# Patient Record
Sex: FEMALE | Race: WHITE | NOT HISPANIC OR LATINO | Employment: FULL TIME | URBAN - METROPOLITAN AREA
[De-identification: names, ages, dates, MRNs, and addresses within clinical notes are randomized per-mention and may not be internally consistent; named-entity substitution may affect disease eponyms.]

---

## 2023-11-21 ENCOUNTER — OFFICE VISIT (OUTPATIENT)
Dept: NEUROLOGY | Facility: CLINIC | Age: 53
End: 2023-11-21
Payer: COMMERCIAL

## 2023-11-21 VITALS
BODY MASS INDEX: 24.15 KG/M2 | DIASTOLIC BLOOD PRESSURE: 61 MMHG | HEIGHT: 60 IN | HEART RATE: 75 BPM | WEIGHT: 123 LBS | SYSTOLIC BLOOD PRESSURE: 90 MMHG

## 2023-11-21 DIAGNOSIS — G44.89 CHRONIC MIXED HEADACHE SYNDROME: Primary | ICD-10-CM

## 2023-11-21 DIAGNOSIS — D32.9 MENINGIOMA (MULTI): ICD-10-CM

## 2023-11-21 PROCEDURE — 99214 OFFICE O/P EST MOD 30 MIN: CPT | Performed by: PSYCHIATRY & NEUROLOGY

## 2023-11-21 NOTE — PATIENT INSTRUCTIONS
Given the interval enlargement of the meningioma reported on your most recent MRI, I agree that it is important to consider surgery or gamma knife.  It makes sense to consult with the oncologist about what to expect with gamma knife versus open surgery.    As we discussed, your headaches may improve when you start on thyroid replacement.  Untreated hypothyroidism can cause headache.    Please see me in the office in 8 months.

## 2024-09-26 ENCOUNTER — APPOINTMENT (OUTPATIENT)
Dept: NEUROLOGY | Facility: CLINIC | Age: 54
End: 2024-09-26
Payer: COMMERCIAL

## 2024-09-26 VITALS
SYSTOLIC BLOOD PRESSURE: 110 MMHG | BODY MASS INDEX: 24.02 KG/M2 | HEIGHT: 60 IN | DIASTOLIC BLOOD PRESSURE: 70 MMHG | HEART RATE: 62 BPM

## 2024-09-26 DIAGNOSIS — D32.9 MENINGIOMA (MULTI): ICD-10-CM

## 2024-09-26 DIAGNOSIS — R42 VERTIGO: ICD-10-CM

## 2024-09-26 DIAGNOSIS — G25.81 RESTLESS LEGS SYNDROME (RLS): ICD-10-CM

## 2024-09-26 DIAGNOSIS — G44.89 CHRONIC MIXED HEADACHE SYNDROME: Primary | ICD-10-CM

## 2024-09-26 PROCEDURE — 99215 OFFICE O/P EST HI 40 MIN: CPT | Performed by: PSYCHIATRY & NEUROLOGY

## 2024-09-26 NOTE — PROGRESS NOTES
Subjective     Jade Yousif is a 54 y.o. year old female seen in follow-up for chronic mixed headache disorder, burning mouth syndrome, left parasagittal meningioma; also endorses vertigo and stabbing pain in toes.    HPI    54-year-old right-handed woman with past medical history significant for irritable bowel syndrome dating to childhood, Hashimoto's thyroiditis, suspected connective tissue disease (Sjogren's?) without confirmation of a specific disorder for which she has seen rheumatology both through the  and through [facility], possible Crohn's disease, lichen sclerosis, remote former cigarette smoking.     I evaluated her initially on 11/27/18 presenting for a number of complaints including headaches, facial sensory disturbance, right lower extremity pain and paresthesias, and a left parasagittal meningioma for which she has seen neurosurgery. I recommended a thoracic spine MRI which showed minor disc bulges and no concerning findings.      I saw her back for an EMG of the right lower extremity which was unremarkable. We discussed lumbar spine MRI but it was denied by insurance and I recommended lumbar spine x-rays.     She returned on 6/11/19. At that visit I reviewed a follow-up brain MRI from 3/4/19 showing stability of the left parasagittal meningioma versus minimal increase in size, but potentially attributed by neuroradiology to differences in slice positioning. She noted ongoing complaints including bilateral facial paresthesias, perimenstrual headaches, right lower extremity dysesthesia. I recommended starting magnesium 400 mg daily for headaches. I sent her for lumbar spine x-rays which were unremarkable.     I evaluated her again on 3/4/2021, by A/V visit. She reported stability from a headache standpoint. She felt that milk of magnesia, taken as a bowel regimen, was helping with headache control. Her brain MRI was repeated in October 2021.     I evaluated her again on 2/28/2023, in the office,  returning after a long interval.  She noted some worsening of headache pattern in conjunction with worsening of burning mouth syndrome and bifacial dysesthesias.  I reviewed headache preventative options including venlafaxine and gabapentin and she was going to discuss these with her PCP at a subsequent visit.  I advised her to continue following with [facility] neurosurgery for meningioma surveillance.    I evaluated her most recently on 11/21/2023.  She continued to endorse an episodic headache pattern.  She was about to start on thyroid replacement and we discussed that this might lead to improvement both in headaches and in burning mouth symptoms.  I advised her to continue following with neurosurgery and to keep her upcoming appointment with oncology regarding her meningioma.    She is evaluated again today in the office.    She has met with neurosurgery and radiation oncology at [facility].  She indicates that open surgery for the meningioma was initially advised and has been recommended to be accomplished within the next year, but after she brought up issues including apparently an allergy to all forms of metal (including potentially surgical staples) and multiple medication sensitivities, it was recommended instead to consider gamma knife radiosurgery.    However, she indicates that concerns have been raised about gamma knife potentially causing cerebral edema in the context of her autoimmune history including Sjogren's and Hashimoto's thyroiditis.  She has not discussed this issue specifically with a rheumatologist.    Accordingly she is not sure whether she should consider proceeding with open surgery for the meningioma or gamma knife radiosurgery.    Her thyroid has been out of range again lately and adjustments have been made to her replacement regimen.    She experiences headaches for the most part only around the time when she used to experience a monthly menstrual cycle.  However, this may amount to 7 consecutive  days of headache.  She takes only acetaminophen 500 mg, reminding me that she has an NSAID allergy and a history of sensitivity to multiple medications.    She continues to note burning mouth symptoms as well as a general burning sensation involving the entire head and upper body on a frequent basis.    Lately she has begun noting intermittent shooting pains in the toes of both feet, for the most part confined to the nighttime hours and relieved by walking, suggestive of RLS.  She does not take anything for it.    She notes intermittent blurry vision which she attributes to blepharitis.    Review of Systems    As per the history of present illness    There is no problem list on file for this patient.    Past Medical History:   Diagnosis Date    Personal history of diseases of the skin and subcutaneous tissue 2018    History of alopecia    Personal history of other benign neoplasm 2022    History of meningioma    Personal history of other benign neoplasm 10/27/2021    History of meningioma    Personal history of other diseases of the circulatory system 2018    History of hypotension    Personal history of other diseases of the digestive system 2018    History of irritable bowel syndrome    Personal history of other specified conditions 2018    History of syncope     Past Surgical History:   Procedure Laterality Date     SECTION, CLASSIC  2018     Section     Social History     Tobacco Use    Smoking status: Former     Types: Cigarettes    Smokeless tobacco: Not on file   Substance Use Topics    Alcohol use: Not on file     family history is not on file.  No current outpatient medications on file.  Not on File    Objective   Neurological Exam  Physical Exam    General: Alert woman who was ambulatory without assistive devices.       Mental Status: Clear sensorium without fluctuation.  Appropriate in conversation.  Good grasp of details of medical history.  Fluent  unremarkable speech without paraphasic errors or hesitancy.     Cranial Nerves:  Funduscopic exam revealed sharp temporal disc margins bilaterally.  Pupils were equal, round and reactive to light with no relative afferent pupillary defect.  Extraocular movements were intact and conjugate without nystagmus.  No ptosis.  Visual fields were full to confrontation tested binocularly.  Facial motor function was symmetrically intact.  Hearing was grossly intact.  No dysarthria.  Shoulder shrug was symmetric.  Tongue protrusion was midline.     Motor: Muscle bulk and tone were normal throughout. There was no pronator drift or asymmetry of finger taps. Confrontation strength was symmetrically 5/5 throughout the upper and lower extremities.      Coordination: There was no postural or rest tremor, myoclonus or dystonic posturing.    Tendon reflexes: Symmetrically 2+ patellar and ankles.     Sensation: Vibration thresholds were normal at the great toes.  Pin was symmetrically sharp over the toes.       Station: Intact and stable.     Gait: Stable and unremarkable.        Assessment/Plan     She appears neurologically stable.    Her headaches are confined to a menstrual timing even though she no longer experiences a cycle, and respond partially to acetaminophen.  She is somewhat reluctant to consider conventional headache medication given her history of multiple medication sensitivities so I suggested a nutraceutical strategy starting with riboflavin 400 mg daily.  I reviewed with her that apart from yellow discoloration of the urine, riboflavin has very few side effects.    She endorses foot symptoms occurring nocturnally that are very suggestive of RLS.  There is no evidence of polyneuropathy on her exam today.  Among natural strategies for RLS I discussed turmeric at a dose of 450 to 500 mg nightly.  I reviewed with her that apart from mild blood thinning properties that might lead to bleeding or bruising, turmeric has fewer  side effects.  She is to stop it if she notes any issues with it.    She pressed me repeatedly today regarding whether the better course for her is to proceed with open meningioma resection or gamma knife radiosurgery.  We discussed these issues in some detail.  I discussed with her that although there are some obvious differences that might weigh in favor of one approach or the other (such as risks inherent in craniotomy compared to a noninvasive approach), things are complicated in her case because of her sensitivities and autoimmune disease.  I advised her that in addition to meeting again with her neurosurgeon and radiation oncologist, ideally in a group conference call format, I would recommend that she confer with a rheumatologist regarding whether her autoimmune history actually puts her at increased risk for complications from gamma knife radiosurgery.  I also discussed with her that in-depth technical questions regarding the pros and cons of open surgery versus gamma knife should be addressed directly to her surgeon and radiation oncologist as they are beyond my expertise and scope of practice.    I advised her to follow-up in 9 months.

## 2024-09-26 NOTE — PATIENT INSTRUCTIONS
As a natural preventative for headaches which may also help to prevent your vertigo episodes, I recommend you try riboflavin (vitamin B2) 400 mg daily.  You should be able to find this over-the-counter at [employer], [employer], or [employer].    As a natural preventative for restless legs syndrome, which I think is the basis for your shooting pain in the toes at night, you can try turmeric 450 to 500 mg at bedtime.  You can obtain this over-the-counter as well in capsule form.  Turmeric is slightly blood thinning so if you note bleeding or bruising, simply stop it.    Please meet again with your neurosurgeon and radiation oncologist to discuss whether over all gamma knife or open surgery is the best approach for your meningioma.  If there are concerns about gamma knife in the context of your history of autoimmune disease, then it may help to get another rheumatology opinion.    Please see me in 9 months.

## 2025-07-27 ENCOUNTER — OFFICE VISIT (OUTPATIENT)
Dept: URGENT CARE | Age: 55
End: 2025-07-27
Payer: COMMERCIAL

## 2025-07-27 VITALS
HEART RATE: 80 BPM | DIASTOLIC BLOOD PRESSURE: 70 MMHG | WEIGHT: 123 LBS | SYSTOLIC BLOOD PRESSURE: 112 MMHG | TEMPERATURE: 98.4 F | HEIGHT: 60 IN | BODY MASS INDEX: 24.15 KG/M2 | OXYGEN SATURATION: 98 % | RESPIRATION RATE: 18 BRPM

## 2025-07-27 DIAGNOSIS — R30.0 BURNING WITH URINATION: ICD-10-CM

## 2025-07-27 DIAGNOSIS — N76.0 ACUTE VAGINITIS: Primary | ICD-10-CM

## 2025-07-27 LAB
POC APPEARANCE, URINE: CLEAR
POC BILIRUBIN, URINE: NEGATIVE
POC BLOOD, URINE: NEGATIVE
POC COLOR, URINE: COLORLESS
POC GLUCOSE, URINE: NEGATIVE MG/DL
POC KETONES, URINE: NEGATIVE MG/DL
POC LEUKOCYTES, URINE: NEGATIVE
POC NITRITE,URINE: NEGATIVE
POC PH, URINE: 6 PH
POC PROTEIN, URINE: NEGATIVE MG/DL
POC SPECIFIC GRAVITY, URINE: <=1.005
POC UROBILINOGEN, URINE: 0.2 EU/DL
PREGNANCY TEST URINE, POC: NEGATIVE

## 2025-07-27 PROCEDURE — 99203 OFFICE O/P NEW LOW 30 MIN: CPT | Performed by: NURSE PRACTITIONER

## 2025-07-27 PROCEDURE — 81003 URINALYSIS AUTO W/O SCOPE: CPT | Performed by: NURSE PRACTITIONER

## 2025-07-27 PROCEDURE — 81025 URINE PREGNANCY TEST: CPT | Performed by: NURSE PRACTITIONER

## 2025-07-27 PROCEDURE — 3008F BODY MASS INDEX DOCD: CPT | Performed by: NURSE PRACTITIONER

## 2025-07-27 RX ORDER — HYOSCYAMINE SULFATE 0.12 MG/1
0.12 TABLET SUBLINGUAL EVERY 4 HOURS PRN
COMMUNITY
Start: 2025-07-19 | End: 2025-08-18

## 2025-07-27 RX ORDER — POLYETHYLENE GLYCOL 3350 17 G/17G
17 POWDER, FOR SOLUTION ORAL DAILY
COMMUNITY

## 2025-07-27 RX ORDER — LANOLIN ALCOHOL/MO/W.PET/CERES
1 CREAM (GRAM) TOPICAL AS NEEDED
COMMUNITY
Start: 2025-07-10 | End: 2025-08-02 | Stop reason: WASHOUT

## 2025-07-27 RX ORDER — WITCH HAZEL 50 %
1 PADS, MEDICATED (EA) TOPICAL
COMMUNITY

## 2025-07-27 RX ORDER — CETIRIZINE HYDROCHLORIDE 10 MG/1
10 TABLET ORAL DAILY
COMMUNITY

## 2025-07-27 RX ORDER — LEVOTHYROXINE SODIUM 75 UG/1
TABLET ORAL
COMMUNITY
Start: 2024-11-01 | End: 2025-08-02 | Stop reason: WASHOUT

## 2025-07-27 RX ORDER — BACLOFEN 10 MG/1
5 TABLET ORAL EVERY 8 HOURS PRN
COMMUNITY
Start: 2025-07-09 | End: 2025-08-08

## 2025-07-27 ASSESSMENT — ENCOUNTER SYMPTOMS
HEMATURIA: 0
DIFFICULTY URINATING: 0
FREQUENCY: 0
COUGH: 0
FATIGUE: 0
CHILLS: 0
ABDOMINAL PAIN: 0
FLANK PAIN: 0
APPETITE CHANGE: 0
VOMITING: 0
CONSTIPATION: 0
ACTIVITY CHANGE: 0
MYALGIAS: 0
DIZZINESS: 0
DYSURIA: 1
DIAPHORESIS: 0
SHORTNESS OF BREATH: 0
NAUSEA: 0

## 2025-07-27 NOTE — PROGRESS NOTES
"Subjective   Patient ID: Jade Yousif is a 54 y.o. female. They present today with a chief complaint of burning with urination (Painful burning with urination x today. ).    History of Present Illness  HPI  Patient reports having a brain tumor removed 2 weeks ago. No urinary cath was used. She states she had an episode of burning with urinating but unsure if it was vaginally related. Denies discharge. Reports her Lichens causing issues with tolerating vaginal testing and the reason she didn't have a urinary catheter. \"I have been hospitalized for yeast before.\"   Denies urinary frequency, fever, chills, back pain.     Past Medical History  Allergies as of 07/27/2025 - Reviewed 07/27/2025   Allergen Reaction Noted    Nsaids (non-steroidal anti-inflammatory drug) Unknown 02/01/2022    Sulfa (sulfonamide antibiotics) Unknown and Other 03/31/2005    Tetracyclines Unknown and Other 03/31/2005    Valacyclovir GI intolerance, GI Upset, and Unknown 11/28/2018       Prescriptions Prior to Admission[1]     Medical History[2]    Surgical History[3]     reports that she has quit smoking. Her smoking use included cigarettes. She does not have any smokeless tobacco history on file.    Review of Systems  Review of Systems   Constitutional:  Negative for activity change, appetite change, chills, diaphoresis and fatigue.   Respiratory:  Negative for cough and shortness of breath.    Cardiovascular:  Negative for chest pain.   Gastrointestinal:  Negative for abdominal pain, constipation, nausea and vomiting.   Genitourinary:  Positive for dysuria. Negative for difficulty urinating, enuresis, flank pain, frequency, hematuria, urgency, vaginal bleeding and vaginal discharge.   Musculoskeletal:  Negative for myalgias.   Neurological:  Negative for dizziness.                                  Objective    Vitals:    07/27/25 1726   BP: 112/70   BP Location: Left arm   Patient Position: Sitting   BP Cuff Size: Small adult   Pulse: 80 "   Resp: 18   Temp: 36.9 °C (98.4 °F)   TempSrc: Oral   SpO2: 98%   Weight: 55.8 kg (123 lb)   Height: (!) 1.524 m (5')     No LMP recorded.    Physical Exam  Vitals reviewed.   Constitutional:       General: She is not in acute distress.    Cardiovascular:      Rate and Rhythm: Normal rate and regular rhythm.   Pulmonary:      Effort: Pulmonary effort is normal.      Breath sounds: Normal breath sounds.     Skin:     General: Skin is warm and dry.     Neurological:      Mental Status: She is alert.     Psychiatric:         Mood and Affect: Mood normal.         Procedures    Point of Care Test & Imaging Results from this visit  Results for orders placed or performed in visit on 07/27/25   POCT UA Automated manually resulted   Result Value Ref Range    POC Color, Urine Colorless (A) Straw, Yellow, Light-Yellow    POC Appearance, Urine Clear Clear    POC Glucose, Urine NEGATIVE NEGATIVE mg/dl    POC Bilirubin, Urine NEGATIVE NEGATIVE    POC Ketones, Urine NEGATIVE NEGATIVE mg/dl    POC Specific Gravity, Urine <=1.005 1.005 - 1.035    POC Blood, Urine NEGATIVE NEGATIVE    POC PH, Urine 6.0 No Reference Range Established PH    POC Protein, Urine NEGATIVE NEGATIVE mg/dl    POC Urobilinogen, Urine 0.2 0.2, 1.0 EU/DL    Poc Nitrite, Urine NEGATIVE NEGATIVE    POC Leukocytes, Urine NEGATIVE NEGATIVE   POCT pregnancy, urine manually resulted   Result Value Ref Range    Preg Test, Ur Negative Negative      Imaging  No results found.    Cardiology, Vascular, and Other Imaging  No other imaging results found for the past 2 days      Diagnostic study results (if any) were reviewed by MARJAN Felix.    Assessment/Plan   Allergies, medications, history, and pertinent labs/EKGs/Imaging reviewed by MARJAN Felix.     Medical Decision Making  Patient came in with an episode of dysuria, negative urine dip. She self swabbed for bv/yeast but unable to swab deeply due to her lichens per patient. Discussed  OTC treatment and will send out cultures. Discussed follow up with pcp if symptoms do not improve.     Orders and Diagnoses  Diagnoses and all orders for this visit:  Acute vaginitis  Burning with urination  -     POCT UA Automated manually resulted  -     POCT pregnancy, urine manually resulted  -     Urine Culture  -     Vaginitis Gram Stain For Bacterial Vaginosis + Yeast      Medical Admin Record      Patient disposition: Home    Electronically signed by MARJAN Felix  5:54 PM           [1] (Not in a hospital admission)   [2]   Past Medical History:  Diagnosis Date    Personal history of diseases of the skin and subcutaneous tissue 2018    History of alopecia    Personal history of other benign neoplasm 2022    History of meningioma    Personal history of other benign neoplasm 10/27/2021    History of meningioma    Personal history of other diseases of the circulatory system 2018    History of hypotension    Personal history of other diseases of the digestive system 2018    History of irritable bowel syndrome    Personal history of other specified conditions 2018    History of syncope   [3]   Past Surgical History:  Procedure Laterality Date     SECTION, CLASSIC  2018     Section

## 2025-07-27 NOTE — PATIENT INSTRUCTIONS
AZO is a brand name for products containing phenazopyridine, an over-the-counter (OTC) medication used to relieve symptoms of urinary tract infections (UTIs).

## 2025-07-28 LAB — BV SCORE VAG QL: NORMAL

## 2025-07-29 ENCOUNTER — APPOINTMENT (OUTPATIENT)
Dept: NEUROLOGY | Facility: CLINIC | Age: 55
End: 2025-07-29
Payer: COMMERCIAL

## 2025-07-29 LAB — BACTERIA UR CULT: NORMAL

## 2025-08-02 ENCOUNTER — OFFICE VISIT (OUTPATIENT)
Dept: URGENT CARE | Age: 55
End: 2025-08-02
Payer: COMMERCIAL

## 2025-08-02 VITALS
SYSTOLIC BLOOD PRESSURE: 98 MMHG | RESPIRATION RATE: 16 BRPM | TEMPERATURE: 97.8 F | OXYGEN SATURATION: 96 % | HEART RATE: 75 BPM | DIASTOLIC BLOOD PRESSURE: 57 MMHG

## 2025-08-02 DIAGNOSIS — R30.0 DYSURIA: Primary | ICD-10-CM

## 2025-08-02 DIAGNOSIS — N89.8 VAGINAL ITCHING: ICD-10-CM

## 2025-08-02 LAB
POC APPEARANCE, URINE: CLEAR
POC BILIRUBIN, URINE: NEGATIVE
POC BLOOD, URINE: NEGATIVE
POC COLOR, URINE: YELLOW
POC GLUCOSE, URINE: NEGATIVE MG/DL
POC KETONES, URINE: NEGATIVE MG/DL
POC LEUKOCYTES, URINE: NEGATIVE
POC NITRITE,URINE: NEGATIVE
POC PH, URINE: 7 PH
POC PROTEIN, URINE: NEGATIVE MG/DL
POC SPECIFIC GRAVITY, URINE: <=1.005
POC UROBILINOGEN, URINE: 0.2 EU/DL

## 2025-08-02 PROCEDURE — 99213 OFFICE O/P EST LOW 20 MIN: CPT | Performed by: PHYSICIAN ASSISTANT

## 2025-08-02 PROCEDURE — 81003 URINALYSIS AUTO W/O SCOPE: CPT | Performed by: PHYSICIAN ASSISTANT

## 2025-08-02 RX ORDER — LANOLIN ALCOHOL/MO/W.PET/CERES
1 CREAM (GRAM) TOPICAL DAILY
COMMUNITY
Start: 2018-11-27

## 2025-08-02 RX ORDER — DIPHENHYDRAMINE HCL 12.5MG/5ML
ELIXIR ORAL
COMMUNITY

## 2025-08-02 RX ORDER — KETOCONAZOLE 20 MG/ML
SHAMPOO, SUSPENSION TOPICAL
COMMUNITY
Start: 2024-09-14

## 2025-08-02 RX ORDER — LOTEPREDNOL ETABONATE 2 MG/ML
SUSPENSION/ DROPS OPHTHALMIC
COMMUNITY

## 2025-08-02 RX ORDER — MUPIROCIN 20 MG/G
OINTMENT TOPICAL
COMMUNITY
Start: 2025-02-13

## 2025-08-02 RX ORDER — OXYCODONE HYDROCHLORIDE 5 MG/1
TABLET ORAL
COMMUNITY
Start: 2025-07-09

## 2025-08-02 RX ORDER — ESTRADIOL 0.1 MG/G
CREAM VAGINAL
COMMUNITY
Start: 2014-11-14

## 2025-08-02 RX ORDER — ALBUTEROL SULFATE 90 UG/1
1 INHALANT RESPIRATORY (INHALATION) EVERY 4 HOURS PRN
COMMUNITY
Start: 2024-06-03

## 2025-08-02 RX ORDER — PHENAZOPYRIDINE HYDROCHLORIDE 200 MG/1
200 TABLET, FILM COATED ORAL 3 TIMES DAILY PRN
Qty: 10 TABLET | Refills: 0 | Status: SHIPPED | OUTPATIENT
Start: 2025-08-02 | End: 2025-08-02

## 2025-08-02 RX ORDER — CYST/ALA/Q10/PHOS.SER/DHA/BROC 100-20-50
POWDER (GRAM) ORAL
COMMUNITY

## 2025-08-02 RX ORDER — GLUCAGON HYDROCHLORIDE 1 MG/ML
1 INJECTION, POWDER, FOR SOLUTION INTRAMUSCULAR; INTRAVENOUS; SUBCUTANEOUS
COMMUNITY
Start: 2023-12-14

## 2025-08-02 RX ORDER — LEVETIRACETAM 500 MG/1
TABLET ORAL
COMMUNITY
Start: 2025-07-09

## 2025-08-02 RX ORDER — FLUTICASONE PROPIONATE 50 MCG
SPRAY, SUSPENSION (ML) NASAL
COMMUNITY
Start: 2024-07-09

## 2025-08-02 RX ORDER — LEVOTHYROXINE SODIUM 50 UG/1
1 TABLET ORAL DAILY
COMMUNITY

## 2025-08-02 RX ORDER — ACETAMINOPHEN 500 MG/1
1000 CAPSULE, LIQUID FILLED ORAL EVERY 8 HOURS PRN
COMMUNITY

## 2025-08-02 RX ORDER — MECLIZINE HCL 12.5 MG 12.5 MG/1
TABLET ORAL
COMMUNITY

## 2025-08-02 RX ORDER — METRONIDAZOLE 7.5 MG/G
0.75 CREAM TOPICAL 2 TIMES DAILY
COMMUNITY

## 2025-08-02 RX ORDER — ADHESIVE BANDAGE
BANDAGE TOPICAL DAILY PRN
COMMUNITY

## 2025-08-02 RX ORDER — PHENAZOPYRIDINE HYDROCHLORIDE 200 MG/1
200 TABLET, FILM COATED ORAL 3 TIMES DAILY PRN
Qty: 10 TABLET | Refills: 0 | Status: SHIPPED | OUTPATIENT
Start: 2025-08-02

## 2025-08-02 RX ORDER — FLUOCINOLONE ACETONIDE 0.11 MG/ML
OIL AURICULAR (OTIC)
COMMUNITY
Start: 2023-08-14

## 2025-08-02 RX ORDER — HYDROXYZINE HYDROCHLORIDE 10 MG/1
10 TABLET, FILM COATED ORAL NIGHTLY
COMMUNITY
Start: 2024-07-08

## 2025-08-02 RX ORDER — LIDOCAINE AND PRILOCAINE 25; 25 MG/G; MG/G
CREAM TOPICAL
COMMUNITY
Start: 2025-07-28

## 2025-08-02 RX ORDER — LORATADINE 10 MG/1
10 TABLET ORAL
COMMUNITY

## 2025-08-02 RX ORDER — BEPOTASTINE BESILATE 15 MG/ML
SOLUTION/ DROPS OPHTHALMIC
COMMUNITY

## 2025-08-02 ASSESSMENT — PAIN SCALES - GENERAL: PAINLEVEL_OUTOF10: 0-NO PAIN

## 2025-08-02 NOTE — PROGRESS NOTES
Subjective   Patient ID: Jade Yousif is a 54 y.o. female. They present today with a chief complaint of Vaginal Itching.    History of Present Illness    Vaginal Itching    This is a 54-year-old female here for dysuria.  Started a week or 2 ago.  Dors is dysuria and vaginal irritation.  Was seen in urgent care had contaminated urine culture.  She then was seen by her GYN BV, trichomoniasis/yeast and mycoplasma were negative.  She had another urine culture which was reportedly contaminated.  Denies fevers or chills.  She does have an appointment with urology in several months  Past Medical History  Allergies as of 08/02/2025 - Reviewed 08/02/2025   Allergen Reaction Noted    Nsaids (non-steroidal anti-inflammatory drug) Unknown 02/01/2022    Sulfa (sulfonamide antibiotics) Unknown and Other 03/31/2005    Tetracyclines Unknown and Other 03/31/2005    Valacyclovir GI intolerance, GI Upset, and Unknown 11/28/2018       Prescriptions Prior to Admission[1]     Medical History[2]    Surgical History[3]     reports that she has quit smoking. Her smoking use included cigarettes. She does not have any smokeless tobacco history on file.    Review of Systems  Review of Systems   All other systems reviewed and are negative.                                 Objective    Vitals:    08/02/25 1034   BP: 98/57   Pulse: 75   Resp: 16   Temp: 36.6 °C (97.8 °F)   SpO2: 96%     No LMP recorded (lmp unknown). Patient is postmenopausal.    Physical Exam  Physical Exam    General: Vitals noted, no distress. Afebrile.    EENT: Posterior oropharynx unremarkable.    Cardiac: Regular, rate, rhythm    Pulmonary: Lungs clear bilaterally with good aeration. No adventitious breath sounds.    Abdomen: Soft, nonsurgical. Nontender. No peritoneal signs. Normoactive bowel sounds.    Back: No CVA tenderness bilaterally.    Extremities: No peripheral edema. Neurovascularly intact throughout.    Skin: No rash.    Neuro: No gross neurological  deficits.  Procedures    Point of Care Test & Imaging Results from this visit  Results for orders placed or performed in visit on 08/02/25   POCT UA Automated manually resulted   Result Value Ref Range    POC Color, Urine Yellow Straw, Yellow, Light-Yellow    POC Appearance, Urine Clear Clear    POC Glucose, Urine NEGATIVE NEGATIVE mg/dl    POC Bilirubin, Urine NEGATIVE NEGATIVE    POC Ketones, Urine NEGATIVE NEGATIVE mg/dl    POC Specific Gravity, Urine <=1.005 1.005 - 1.035    POC Blood, Urine NEGATIVE NEGATIVE    POC PH, Urine 7.0 No Reference Range Established PH    POC Protein, Urine NEGATIVE NEGATIVE mg/dl    POC Urobilinogen, Urine 0.2 0.2, 1.0 EU/DL    Poc Nitrite, Urine NEGATIVE NEGATIVE    POC Leukocytes, Urine NEGATIVE NEGATIVE      Imaging  No results found.    Cardiology, Vascular, and Other Imaging  No other imaging results found for the past 2 days      Diagnostic study results (if any) were reviewed by Julian Esteban PA-C.    Assessment/Plan   Allergies, medications, history, and pertinent labs/EKGs/Imaging reviewed by Julian Esteban PA-C.     Medical Decision Making  Summary: Patient presents to urgent care for UTI symptoms. Patient is well-appearing nontoxic on the exam. Vital signs reviewed. Differential diagnosis includes not limited to UTI, pyelonephritis, or nephrolithiasis.  Urine dip demonstrates no infection.  Urine sent for culture.  Follow-up with GYN/urology.  I did prescribe Pyridium for dysuria.  Stable for discharge. Return to urgent care or go to the emergency department if symptoms worsen or if new symptoms develop.    Orders and Diagnoses  Diagnoses and all orders for this visit:  Vaginal itching  -     POCT UA Automated manually resulted      Medical Admin Record      Patient disposition: Home    Electronically signed by Julian Esteban PA-C  10:54 AM           [1] (Not in a hospital admission)  [2]   Past Medical History:  Diagnosis Date    Personal history of diseases of the skin and  subcutaneous tissue 2018    History of alopecia    Personal history of other benign neoplasm 2022    History of meningioma    Personal history of other benign neoplasm 10/27/2021    History of meningioma    Personal history of other diseases of the circulatory system 2018    History of hypotension    Personal history of other diseases of the digestive system 2018    History of irritable bowel syndrome    Personal history of other specified conditions 2018    History of syncope   [3]   Past Surgical History:  Procedure Laterality Date     SECTION, CLASSIC  2018     Section

## 2025-08-04 LAB — BACTERIA UR CULT: NORMAL
